# Patient Record
Sex: MALE | Race: WHITE | NOT HISPANIC OR LATINO
[De-identification: names, ages, dates, MRNs, and addresses within clinical notes are randomized per-mention and may not be internally consistent; named-entity substitution may affect disease eponyms.]

---

## 2017-12-29 ENCOUNTER — APPOINTMENT (OUTPATIENT)
Dept: RADIOLOGY | Facility: CLINIC | Age: 47
End: 2017-12-29
Payer: COMMERCIAL

## 2017-12-29 ENCOUNTER — OUTPATIENT (OUTPATIENT)
Dept: OUTPATIENT SERVICES | Facility: HOSPITAL | Age: 47
LOS: 1 days | End: 2017-12-29
Payer: COMMERCIAL

## 2017-12-29 DIAGNOSIS — Z00.8 ENCOUNTER FOR OTHER GENERAL EXAMINATION: ICD-10-CM

## 2017-12-29 PROCEDURE — 73030 X-RAY EXAM OF SHOULDER: CPT | Mod: 26,RT

## 2017-12-29 PROCEDURE — 73030 X-RAY EXAM OF SHOULDER: CPT

## 2018-10-23 DIAGNOSIS — I25.84 ATHEROSCLEROTIC HEART DISEASE OF NATIVE CORONARY ARTERY W/OUT ANGINA PECTORIS: ICD-10-CM

## 2018-10-23 DIAGNOSIS — I25.10 ATHEROSCLEROTIC HEART DISEASE OF NATIVE CORONARY ARTERY W/OUT ANGINA PECTORIS: ICD-10-CM

## 2018-10-24 ENCOUNTER — EMERGENCY (EMERGENCY)
Facility: HOSPITAL | Age: 48
LOS: 1 days | Discharge: HOME | End: 2018-10-24
Attending: EMERGENCY MEDICINE
Payer: COMMERCIAL

## 2018-10-24 VITALS
HEIGHT: 68 IN | DIASTOLIC BLOOD PRESSURE: 92 MMHG | RESPIRATION RATE: 19 BRPM | SYSTOLIC BLOOD PRESSURE: 142 MMHG | HEART RATE: 91 BPM | WEIGHT: 175.93 LBS | TEMPERATURE: 98 F | OXYGEN SATURATION: 99 %

## 2018-10-24 LAB
ALBUMIN SERPL ELPH-MCNC: 4.6 G/DL — SIGNIFICANT CHANGE UP (ref 3.3–5)
ALP SERPL-CCNC: 67 U/L — SIGNIFICANT CHANGE UP (ref 40–120)
ALT FLD-CCNC: 56 U/L — SIGNIFICANT CHANGE UP (ref 12–78)
ANION GAP SERPL CALC-SCNC: 9 MMOL/L — SIGNIFICANT CHANGE UP (ref 5–17)
AST SERPL-CCNC: 36 U/L — SIGNIFICANT CHANGE UP (ref 15–37)
BASOPHILS # BLD AUTO: 0.06 K/UL — SIGNIFICANT CHANGE UP (ref 0–0.2)
BASOPHILS NFR BLD AUTO: 1.2 % — SIGNIFICANT CHANGE UP (ref 0–2)
BILIRUB SERPL-MCNC: 0.5 MG/DL — SIGNIFICANT CHANGE UP (ref 0.2–1.2)
BUN SERPL-MCNC: 12 MG/DL — SIGNIFICANT CHANGE UP (ref 7–23)
CALCIUM SERPL-MCNC: 9.4 MG/DL — SIGNIFICANT CHANGE UP (ref 8.5–10.1)
CHLORIDE SERPL-SCNC: 105 MMOL/L — SIGNIFICANT CHANGE UP (ref 96–108)
CO2 SERPL-SCNC: 27 MMOL/L — SIGNIFICANT CHANGE UP (ref 22–31)
CREAT SERPL-MCNC: 1 MG/DL — SIGNIFICANT CHANGE UP (ref 0.5–1.3)
EOSINOPHIL # BLD AUTO: 0.18 K/UL — SIGNIFICANT CHANGE UP (ref 0–0.5)
EOSINOPHIL NFR BLD AUTO: 3.7 % — SIGNIFICANT CHANGE UP (ref 0–6)
GLUCOSE SERPL-MCNC: 93 MG/DL — SIGNIFICANT CHANGE UP (ref 70–99)
HCT VFR BLD CALC: 45.9 % — SIGNIFICANT CHANGE UP (ref 39–50)
HGB BLD-MCNC: 15.5 G/DL — SIGNIFICANT CHANGE UP (ref 13–17)
IMM GRANULOCYTES NFR BLD AUTO: 0.2 % — SIGNIFICANT CHANGE UP (ref 0–1.5)
LYMPHOCYTES # BLD AUTO: 1.63 K/UL — SIGNIFICANT CHANGE UP (ref 1–3.3)
LYMPHOCYTES # BLD AUTO: 33.7 % — SIGNIFICANT CHANGE UP (ref 13–44)
MCHC RBC-ENTMCNC: 29.1 PG — SIGNIFICANT CHANGE UP (ref 27–34)
MCHC RBC-ENTMCNC: 33.8 GM/DL — SIGNIFICANT CHANGE UP (ref 32–36)
MCV RBC AUTO: 86.3 FL — SIGNIFICANT CHANGE UP (ref 80–100)
MONOCYTES # BLD AUTO: 0.61 K/UL — SIGNIFICANT CHANGE UP (ref 0–0.9)
MONOCYTES NFR BLD AUTO: 12.6 % — SIGNIFICANT CHANGE UP (ref 2–14)
NEUTROPHILS # BLD AUTO: 2.35 K/UL — SIGNIFICANT CHANGE UP (ref 1.8–7.4)
NEUTROPHILS NFR BLD AUTO: 48.6 % — SIGNIFICANT CHANGE UP (ref 43–77)
PLATELET # BLD AUTO: 347 K/UL — SIGNIFICANT CHANGE UP (ref 150–400)
POTASSIUM SERPL-MCNC: 3.6 MMOL/L — SIGNIFICANT CHANGE UP (ref 3.5–5.3)
POTASSIUM SERPL-SCNC: 3.6 MMOL/L — SIGNIFICANT CHANGE UP (ref 3.5–5.3)
PROT SERPL-MCNC: 8.4 G/DL — HIGH (ref 6–8.3)
RBC # BLD: 5.32 M/UL — SIGNIFICANT CHANGE UP (ref 4.2–5.8)
RBC # FLD: 13.8 % — SIGNIFICANT CHANGE UP (ref 10.3–14.5)
SODIUM SERPL-SCNC: 141 MMOL/L — SIGNIFICANT CHANGE UP (ref 135–145)
WBC # BLD: 4.84 K/UL — SIGNIFICANT CHANGE UP (ref 3.8–10.5)
WBC # FLD AUTO: 4.84 K/UL — SIGNIFICANT CHANGE UP (ref 3.8–10.5)

## 2018-10-24 PROCEDURE — 85027 COMPLETE CBC AUTOMATED: CPT

## 2018-10-24 PROCEDURE — 80053 COMPREHEN METABOLIC PANEL: CPT

## 2018-10-24 PROCEDURE — 71046 X-RAY EXAM CHEST 2 VIEWS: CPT

## 2018-10-24 PROCEDURE — 70450 CT HEAD/BRAIN W/O DYE: CPT

## 2018-10-24 PROCEDURE — 36415 COLL VENOUS BLD VENIPUNCTURE: CPT

## 2018-10-24 PROCEDURE — 99284 EMERGENCY DEPT VISIT MOD MDM: CPT

## 2018-10-24 PROCEDURE — 99284 EMERGENCY DEPT VISIT MOD MDM: CPT | Mod: 25

## 2018-10-24 PROCEDURE — 70450 CT HEAD/BRAIN W/O DYE: CPT | Mod: 26

## 2018-10-24 PROCEDURE — 71046 X-RAY EXAM CHEST 2 VIEWS: CPT | Mod: 26

## 2018-10-24 RX ORDER — MECLIZINE HCL 12.5 MG
25 TABLET ORAL ONCE
Qty: 0 | Refills: 0 | Status: COMPLETED | OUTPATIENT
Start: 2018-10-24 | End: 2018-10-24

## 2018-10-24 RX ORDER — SODIUM CHLORIDE 9 MG/ML
1000 INJECTION INTRAMUSCULAR; INTRAVENOUS; SUBCUTANEOUS ONCE
Qty: 0 | Refills: 0 | Status: COMPLETED | OUTPATIENT
Start: 2018-10-24 | End: 2018-10-24

## 2018-10-24 RX ADMIN — SODIUM CHLORIDE 2000 MILLILITER(S): 9 INJECTION INTRAMUSCULAR; INTRAVENOUS; SUBCUTANEOUS at 15:47

## 2018-10-24 RX ADMIN — Medication 25 MILLIGRAM(S): at 16:40

## 2018-10-24 NOTE — ED ADULT NURSE NOTE - NSIMPLEMENTINTERV_GEN_ALL_ED
Implemented All Universal Safety Interventions:  [place] to call system. Call bell, personal items and telephone within reach. Instruct patient to call for assistance. Room bathroom lighting operational. Non-slip footwear when patient is off stretcher. Physically safe environment: no spills, clutter or unnecessary equipment. Stretcher in lowest position, wheels locked, appropriate side rails in place.

## 2018-10-24 NOTE — ED PROVIDER NOTE - OBJECTIVE STATEMENT
47 y/o M with c/o interment dizziness x 4weeks.  Pt was seen by PCP and told to decreased his BP meds.  Pt has done so but no change in symptoms.  Pt states symptoms are worse in the morning when he wakes up and needs to hold onto the wall while walking.  Pt denies HA, vision change, nausea, vomiting.

## 2018-10-31 ENCOUNTER — NON-APPOINTMENT (OUTPATIENT)
Age: 48
End: 2018-10-31

## 2018-10-31 ENCOUNTER — APPOINTMENT (OUTPATIENT)
Dept: CARDIOLOGY | Facility: CLINIC | Age: 48
End: 2018-10-31
Payer: COMMERCIAL

## 2018-10-31 VITALS
BODY MASS INDEX: 27.58 KG/M2 | SYSTOLIC BLOOD PRESSURE: 124 MMHG | DIASTOLIC BLOOD PRESSURE: 80 MMHG | OXYGEN SATURATION: 97 % | WEIGHT: 182 LBS | HEART RATE: 92 BPM | HEIGHT: 68 IN

## 2018-10-31 DIAGNOSIS — Z87.891 PERSONAL HISTORY OF NICOTINE DEPENDENCE: ICD-10-CM

## 2018-10-31 DIAGNOSIS — E78.5 HYPERLIPIDEMIA, UNSPECIFIED: ICD-10-CM

## 2018-10-31 DIAGNOSIS — Z82.49 FAMILY HISTORY OF ISCHEMIC HEART DISEASE AND OTHER DISEASES OF THE CIRCULATORY SYSTEM: ICD-10-CM

## 2018-10-31 DIAGNOSIS — I10 ESSENTIAL (PRIMARY) HYPERTENSION: ICD-10-CM

## 2018-10-31 PROCEDURE — 99205 OFFICE O/P NEW HI 60 MIN: CPT

## 2018-10-31 PROCEDURE — 93000 ELECTROCARDIOGRAM COMPLETE: CPT

## 2018-10-31 RX ORDER — ROSUVASTATIN CALCIUM 40 MG/1
40 TABLET, FILM COATED ORAL DAILY
Refills: 0 | Status: ACTIVE | COMMUNITY

## 2018-10-31 RX ORDER — ASPIRIN 325 MG/1
325 TABLET, FILM COATED ORAL
Refills: 0 | Status: DISCONTINUED | COMMUNITY
End: 2018-10-31

## 2018-10-31 RX ORDER — LOSARTAN POTASSIUM 50 MG/1
50 TABLET, FILM COATED ORAL
Refills: 0 | Status: DISCONTINUED | COMMUNITY
End: 2018-10-31

## 2018-10-31 RX ORDER — ASPIRIN ENTERIC COATED TABLETS 81 MG 81 MG/1
81 TABLET, DELAYED RELEASE ORAL DAILY
Refills: 0 | Status: ACTIVE | COMMUNITY

## 2018-10-31 RX ORDER — LOSARTAN POTASSIUM AND HYDROCHLOROTHIAZIDE 12.5; 1 MG/1; MG/1
100-12.5 TABLET ORAL DAILY
Refills: 0 | Status: ACTIVE | COMMUNITY

## 2018-10-31 RX ORDER — FENOFIBRATE 160 MG/1
160 TABLET ORAL DAILY
Refills: 0 | Status: ACTIVE | COMMUNITY

## 2018-12-11 ENCOUNTER — APPOINTMENT (OUTPATIENT)
Dept: CARDIOLOGY | Facility: CLINIC | Age: 48
End: 2018-12-11
Payer: COMMERCIAL

## 2018-12-11 PROCEDURE — 93015 CV STRESS TEST SUPVJ I&R: CPT

## 2018-12-19 PROBLEM — I25.10 CORONARY ARTERY CALCIFICATION: Status: ACTIVE | Noted: 2018-12-19

## 2020-05-18 ENCOUNTER — TRANSCRIPTION ENCOUNTER (OUTPATIENT)
Age: 50
End: 2020-05-18

## 2023-06-02 ENCOUNTER — OFFICE (OUTPATIENT)
Dept: URBAN - METROPOLITAN AREA CLINIC 70 | Facility: CLINIC | Age: 53
Setting detail: OPHTHALMOLOGY
End: 2023-06-02
Payer: COMMERCIAL

## 2023-06-02 DIAGNOSIS — H52.7: ICD-10-CM

## 2023-06-02 DIAGNOSIS — H40.013: ICD-10-CM

## 2023-06-02 DIAGNOSIS — D31.01: ICD-10-CM

## 2023-06-02 DIAGNOSIS — H16.223: ICD-10-CM

## 2023-06-02 DIAGNOSIS — H35.54: ICD-10-CM

## 2023-06-02 PROBLEM — D31.02 NEVUS,CONJUNCTIVAL; RIGHT EYE: Status: ACTIVE | Noted: 2023-06-02

## 2023-06-02 PROCEDURE — 92014 COMPRE OPH EXAM EST PT 1/>: CPT | Performed by: STUDENT IN AN ORGANIZED HEALTH CARE EDUCATION/TRAINING PROGRAM

## 2023-06-02 PROCEDURE — 92285 EXTERNAL OCULAR PHOTOGRAPHY: CPT | Performed by: STUDENT IN AN ORGANIZED HEALTH CARE EDUCATION/TRAINING PROGRAM

## 2023-06-02 PROCEDURE — 92250 FUNDUS PHOTOGRAPHY W/I&R: CPT | Performed by: STUDENT IN AN ORGANIZED HEALTH CARE EDUCATION/TRAINING PROGRAM

## 2023-06-02 PROCEDURE — 92015 DETERMINE REFRACTIVE STATE: CPT | Performed by: STUDENT IN AN ORGANIZED HEALTH CARE EDUCATION/TRAINING PROGRAM

## 2023-06-02 ASSESSMENT — REFRACTION_CURRENTRX
OS_CYLINDER: -1.00
OD_CYLINDER: -1.00
OS_ADD: +2.00
OS_AXIS: 173
OD_AXIS: 002
OD_OVR_VA: 20/
OS_ADD: +1.25
OD_SPHERE: -3.00
OD_VPRISM_DIRECTION: PROGS
OD_SPHERE: -3.25
OD_OVR_VA: 20/
OD_OVR_VA: 20/
OS_VPRISM_DIRECTION: PROGS
OD_CYLINDER: -1.75
OD_AXIS: 010
OS_AXIS: 180
OD_CYLINDER: -1.25
OS_OVR_VA: 20/
OS_VPRISM_DIRECTION: PROGS
OD_ADD: +2.00
OS_SPHERE: -3.00
OS_OVR_VA: 20/
OS_SPHERE: -3.25
OD_ADD: +1.25
OS_AXIS: 179
OD_SPHERE: -3.25
OD_VPRISM_DIRECTION: PROGS
OS_CYLINDER: -1.00
OS_CYLINDER: -1.00
OD_AXIS: 3
OS_SPHERE: -2.75
OS_OVR_VA: 20/

## 2023-06-02 ASSESSMENT — REFRACTION_MANIFEST
OS_VA2: 20/20
OD_VA2: 20/20
OD_ADD: +2.25
OD_ADD: +2.25
OS_SPHERE: -3.00
OS_AXIS: 175
OS_VA1: 20/20
OS_CYLINDER: -1.50
OD_CYLINDER: -1.25
OD_AXIS: 005
OS_AXIS: 165
OD_SPHERE: -3.00
OS_SPHERE: -3.00
OD_SPHERE: -3.00
OS_ADD: +2.00
OD_SPHERE: -3.00
OS_ADD: +2.25
OD_VA1: 20/20
OS_VA1: 20/20
OS_AXIS: 170
OS_CYLINDER: -1.00
OD_CYLINDER: -1.50
OD_CYLINDER: -1.25
OD_AXIS: 180
OS_ADD: +2..25
OS_CYLINDER: -1.00
OD_VA1: 20/20
OD_ADD: +2.00
OD_VA1: 20/20
OS_VA1: 20/20
OS_SPHERE: -2.50
OD_AXIS: 180

## 2023-06-02 ASSESSMENT — KERATOMETRY
OD_K2POWER_DIOPTERS: 40.25
OD_AXISANGLE_DEGREES: 087
OS_K1POWER_DIOPTERS: 38.75
OS_AXISANGLE_DEGREES: 086
OD_K1POWER_DIOPTERS: 39.00
OS_K2POWER_DIOPTERS: 4.00

## 2023-06-02 ASSESSMENT — AXIALLENGTH_DERIVED
OS_AL: 38.67
OS_AL: 39.47
OD_AL: 26.878
OD_AL: 26.6275
OS_AL: 39.47
OD_AL: 26.8149
OD_AL: 26.8149
OS_AL: 39.2

## 2023-06-02 ASSESSMENT — SPHEQUIV_DERIVED
OD_SPHEQUIV: -3.25
OS_SPHEQUIV: -3.5
OD_SPHEQUIV: -3.625
OD_SPHEQUIV: -3.625
OS_SPHEQUIV: -3.25
OS_SPHEQUIV: -2.75
OD_SPHEQUIV: -3.75
OS_SPHEQUIV: -3.5

## 2023-06-02 ASSESSMENT — VISUAL ACUITY
OD_BCVA: 20/25+1
OS_BCVA: 20/20-1

## 2023-06-02 ASSESSMENT — REFRACTION_AUTOREFRACTION
OD_SPHERE: -2.50
OS_SPHERE: -2.00
OD_AXIS: 179
OS_CYLINDER: -1.50
OS_AXIS: 163
OD_CYLINDER: -1.50

## 2023-06-02 ASSESSMENT — SUPERFICIAL PUNCTATE KERATITIS (SPK)
OS_SPK: 1+
OD_SPK: 1+

## 2023-06-02 ASSESSMENT — CONFRONTATIONAL VISUAL FIELD TEST (CVF)
OS_FINDINGS: FULL
OD_FINDINGS: FULL

## 2023-09-20 ENCOUNTER — OFFICE (OUTPATIENT)
Dept: URBAN - METROPOLITAN AREA CLINIC 70 | Facility: CLINIC | Age: 53
Setting detail: OPHTHALMOLOGY
End: 2023-09-20
Payer: COMMERCIAL

## 2023-09-20 DIAGNOSIS — H40.013: ICD-10-CM

## 2023-09-20 DIAGNOSIS — H16.223: ICD-10-CM

## 2023-09-20 PROCEDURE — 92012 INTRM OPH EXAM EST PATIENT: CPT | Performed by: STUDENT IN AN ORGANIZED HEALTH CARE EDUCATION/TRAINING PROGRAM

## 2023-09-20 PROCEDURE — 92083 EXTENDED VISUAL FIELD XM: CPT | Performed by: STUDENT IN AN ORGANIZED HEALTH CARE EDUCATION/TRAINING PROGRAM

## 2023-09-20 ASSESSMENT — REFRACTION_CURRENTRX
OS_VPRISM_DIRECTION: PROGS
OD_AXIS: 002
OS_ADD: +2.25
OS_CYLINDER: -1.00
OS_VPRISM_DIRECTION: PROGS
OD_AXIS: 3
OD_SPHERE: -3.25
OS_SPHERE: -2.75
OD_SPHERE: -3.25
OD_CYLINDER: -1.75
OD_SPHERE: -3.25
OD_SPHERE: -3.00
OS_OVR_VA: 20/
OD_VPRISM_DIRECTION: PROGS
OS_AXIS: 173
OD_CYLINDER: -1.25
OS_AXIS: 180
OS_ADD: +1.25
OS_SPHERE: -2.50
OS_OVR_VA: 20/
OS_AXIS: 179
OS_CYLINDER: -1.00
OD_VPRISM_DIRECTION: PROGS
OD_OVR_VA: 20/
OS_CYLINDER: -1.25
OS_VPRISM_DIRECTION: PROGS
OD_ADD: +2.25
OS_SPHERE: -3.00
OD_OVR_VA: 20/
OD_CYLINDER: -1.00
OD_OVR_VA: 20/
OS_OVR_VA: 20/
OD_CYLINDER: -1.25
OD_AXIS: 006
OS_CYLINDER: -1.00
OS_AXIS: 163
OS_SPHERE: -3.25
OD_AXIS: 010
OD_ADD: +1.25
OD_ADD: +2.00
OD_VPRISM_DIRECTION: PROGS
OS_ADD: +2.00

## 2023-09-20 ASSESSMENT — REFRACTION_MANIFEST
OS_VA1: 20/20
OS_SPHERE: -3.00
OS_CYLINDER: -1.00
OS_VA2: 20/20
OD_CYLINDER: -1.50
OS_ADD: +2.25
OD_CYLINDER: -1.25
OD_AXIS: 180
OD_SPHERE: -3.00
OD_ADD: +2.00
OS_CYLINDER: -1.50
OS_ADD: +2..25
OD_SPHERE: -3.00
OD_ADD: +2.25
OS_ADD: +2.00
OD_SPHERE: -3.00
OS_CYLINDER: -1.00
OS_VA1: 20/20
OD_CYLINDER: -1.25
OS_AXIS: 175
OS_SPHERE: -2.50
OD_VA1: 20/20
OD_VA2: 20/20
OS_VA1: 20/20
OS_AXIS: 170
OD_ADD: +2.25
OD_AXIS: 180
OD_VA1: 20/20
OD_VA1: 20/20
OS_SPHERE: -3.00
OS_AXIS: 165
OD_AXIS: 005

## 2023-09-20 ASSESSMENT — REFRACTION_AUTOREFRACTION
OS_AXIS: 002
OD_SPHERE: -2.25
OS_SPHERE: -2.25
OD_CYLINDER: -1.50
OD_AXIS: 001
OS_CYLINDER: -1.50

## 2023-09-20 ASSESSMENT — CONFRONTATIONAL VISUAL FIELD TEST (CVF)
OS_FINDINGS: FULL
OD_FINDINGS: FULL

## 2023-09-20 ASSESSMENT — AXIALLENGTH_DERIVED
OS_AL: 26.504
OD_AL: 26.8149
OS_AL: 26.6275
OD_AL: 26.8149
OS_AL: 26.7522
OD_AL: 26.878
OS_AL: 26.7522
OD_AL: 26.504

## 2023-09-20 ASSESSMENT — KERATOMETRY
OS_K1POWER_DIOPTERS: 39.00
OS_AXISANGLE_DEGREES: 085
OD_AXISANGLE_DEGREES: 087
OD_K2POWER_DIOPTERS: 40.25
OD_K1POWER_DIOPTERS: 39.00
OS_K2POWER_DIOPTERS: 40.25

## 2023-09-20 ASSESSMENT — VISUAL ACUITY
OD_BCVA: 20/20
OS_BCVA: 20/20

## 2023-09-20 ASSESSMENT — SUPERFICIAL PUNCTATE KERATITIS (SPK)
OD_SPK: 1+
OS_SPK: 1+

## 2023-09-20 ASSESSMENT — SPHEQUIV_DERIVED
OS_SPHEQUIV: -3.5
OD_SPHEQUIV: -3.625
OS_SPHEQUIV: -3.25
OD_SPHEQUIV: -3.75
OS_SPHEQUIV: -3.5
OD_SPHEQUIV: -3.625
OD_SPHEQUIV: -3
OS_SPHEQUIV: -3

## 2023-09-20 ASSESSMENT — TONOMETRY
OS_IOP_MMHG: 12
OD_IOP_MMHG: 12

## 2024-01-17 ENCOUNTER — OFFICE (OUTPATIENT)
Dept: URBAN - METROPOLITAN AREA CLINIC 70 | Facility: CLINIC | Age: 54
Setting detail: OPHTHALMOLOGY
End: 2024-01-17
Payer: COMMERCIAL

## 2024-01-17 DIAGNOSIS — H40.013: ICD-10-CM

## 2024-01-17 DIAGNOSIS — H16.223: ICD-10-CM

## 2024-01-17 PROCEDURE — 92083 EXTENDED VISUAL FIELD XM: CPT | Performed by: STUDENT IN AN ORGANIZED HEALTH CARE EDUCATION/TRAINING PROGRAM

## 2024-01-17 PROCEDURE — 92012 INTRM OPH EXAM EST PATIENT: CPT | Performed by: STUDENT IN AN ORGANIZED HEALTH CARE EDUCATION/TRAINING PROGRAM

## 2024-01-17 ASSESSMENT — REFRACTION_MANIFEST
OD_CYLINDER: -1.25
OS_CYLINDER: -1.50
OS_SPHERE: -2.50
OS_AXIS: 170
OD_ADD: +2.25
OD_CYLINDER: -1.50
OS_CYLINDER: -1.00
OD_VA1: 20/20
OS_VA1: 20/20
OD_AXIS: 180
OD_VA2: 20/20
OS_VA1: 20/20
OD_VA1: 20/20
OD_ADD: +2.00
OD_VA1: 20/20
OS_AXIS: 175
OS_CYLINDER: -1.00
OD_CYLINDER: -1.25
OS_SPHERE: -3.00
OS_SPHERE: -3.00
OS_VA1: 20/20
OD_AXIS: 005
OS_AXIS: 165
OS_VA2: 20/20
OD_ADD: +2.25
OS_ADD: +2..25
OD_AXIS: 180
OS_ADD: +2.00
OD_SPHERE: -3.00
OD_SPHERE: -3.00
OS_ADD: +2.25
OD_SPHERE: -3.00

## 2024-01-17 ASSESSMENT — CONFRONTATIONAL VISUAL FIELD TEST (CVF)
OD_FINDINGS: FULL
OS_FINDINGS: FULL

## 2024-01-17 ASSESSMENT — REFRACTION_CURRENTRX
OS_SPHERE: -2.50
OD_SPHERE: -3.00
OD_CYLINDER: -1.75
OS_ADD: +1.25
OS_VPRISM_DIRECTION: PROGS
OD_AXIS: 3
OD_OVR_VA: 20/
OS_VPRISM_DIRECTION: PROGS
OD_ADD: +1.25
OD_CYLINDER: -1.25
OD_CYLINDER: -1.25
OD_AXIS: 002
OD_AXIS: 006
OS_SPHERE: -2.75
OS_SPHERE: -3.25
OD_CYLINDER: -1.00
OD_ADD: +2.00
OS_AXIS: 179
OS_ADD: +2.00
OD_VPRISM_DIRECTION: PROGS
OS_VPRISM_DIRECTION: PROGS
OS_CYLINDER: -1.00
OS_OVR_VA: 20/
OS_OVR_VA: 20/
OS_ADD: +2.25
OS_AXIS: 180
OD_SPHERE: -3.25
OD_SPHERE: -3.25
OS_CYLINDER: -1.00
OD_ADD: +2.25
OS_SPHERE: -3.00
OS_CYLINDER: -1.25
OS_OVR_VA: 20/
OS_AXIS: 163
OD_OVR_VA: 20/
OS_CYLINDER: -1.00
OD_VPRISM_DIRECTION: PROGS
OS_AXIS: 173
OD_VPRISM_DIRECTION: PROGS
OD_SPHERE: -3.25
OD_OVR_VA: 20/
OD_AXIS: 010

## 2024-01-17 ASSESSMENT — REFRACTION_AUTOREFRACTION
OD_CYLINDER: -1.75
OS_CYLINDER: -1.50
OD_AXIS: 175
OD_SPHERE: -2.50
OS_AXIS: 177
OS_SPHERE: -2.50

## 2024-01-17 ASSESSMENT — SPHEQUIV_DERIVED
OD_SPHEQUIV: -3.75
OS_SPHEQUIV: -3.25
OD_SPHEQUIV: -3.625
OS_SPHEQUIV: -3.5
OS_SPHEQUIV: -3.5
OD_SPHEQUIV: -3.625
OD_SPHEQUIV: -3.375
OS_SPHEQUIV: -3.25

## 2024-01-17 ASSESSMENT — SUPERFICIAL PUNCTATE KERATITIS (SPK)
OS_SPK: 1+
OD_SPK: 1+

## 2024-08-09 ENCOUNTER — OFFICE (OUTPATIENT)
Dept: URBAN - METROPOLITAN AREA CLINIC 70 | Facility: CLINIC | Age: 54
Setting detail: OPHTHALMOLOGY
End: 2024-08-09
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
## 2024-08-09 PROCEDURE — NO SHOW FE NO SHOW FEE: Performed by: STUDENT IN AN ORGANIZED HEALTH CARE EDUCATION/TRAINING PROGRAM
